# Patient Record
Sex: FEMALE | Race: WHITE | NOT HISPANIC OR LATINO | URBAN - METROPOLITAN AREA
[De-identification: names, ages, dates, MRNs, and addresses within clinical notes are randomized per-mention and may not be internally consistent; named-entity substitution may affect disease eponyms.]

---

## 2024-08-22 PROBLEM — M06.9 RHEUMATOID ARTHRITIS: Status: ACTIVE | Noted: 2024-08-22

## 2024-08-26 ENCOUNTER — TELEPHONE (OUTPATIENT)
Age: 69
End: 2024-08-26
Payer: MEDICARE

## 2024-08-26 PROBLEM — M15.9 OSTEOARTHRITIS OF MULTIPLE JOINTS: Status: ACTIVE | Noted: 2024-08-26

## 2024-08-26 PROBLEM — Z79.1 ENCOUNTER FOR LONG-TERM (CURRENT) USE OF NSAIDS: Status: ACTIVE | Noted: 2024-08-26

## 2024-08-26 PROBLEM — R20.0 NUMBNESS AND TINGLING: Status: ACTIVE | Noted: 2024-08-26

## 2024-08-26 PROBLEM — M81.0 OSTEOPOROSIS: Status: ACTIVE | Noted: 2024-08-26

## 2024-08-26 PROBLEM — E55.9 VITAMIN D DEFICIENCY: Status: ACTIVE | Noted: 2024-08-26

## 2024-08-26 PROBLEM — Z79.899 ENCOUNTER FOR LONG-TERM (CURRENT) USE OF HIGH-RISK MEDICATION: Status: ACTIVE | Noted: 2024-08-26

## 2024-08-26 PROBLEM — M70.61 TROCHANTERIC BURSITIS OF RIGHT HIP: Status: ACTIVE | Noted: 2024-08-26

## 2024-08-26 PROBLEM — R20.2 NUMBNESS AND TINGLING: Status: ACTIVE | Noted: 2024-08-26

## 2024-08-26 NOTE — TELEPHONE ENCOUNTER
CONTACTED PT REGARDING [facility] BEING OUT OFFICE, CAN STILL DO VIRTUAL. SPOTS ARE BLOCKED OFF, BUT CAN RESCHEDULE FOR SAME TIME AS VIRTUAL.     -LYNDSAY

## 2024-09-18 ENCOUNTER — TELEPHONE (OUTPATIENT)
Age: 69
End: 2024-09-18

## 2024-09-18 ENCOUNTER — OFFICE VISIT (OUTPATIENT)
Age: 69
End: 2024-09-18
Payer: MEDICARE

## 2024-09-18 VITALS
BODY MASS INDEX: 28.74 KG/M2 | WEIGHT: 146.4 LBS | HEIGHT: 60 IN | HEART RATE: 56 BPM | TEMPERATURE: 98 F | SYSTOLIC BLOOD PRESSURE: 120 MMHG | DIASTOLIC BLOOD PRESSURE: 70 MMHG

## 2024-09-18 DIAGNOSIS — Z79.899 ENCOUNTER FOR LONG-TERM (CURRENT) USE OF HIGH-RISK MEDICATION: ICD-10-CM

## 2024-09-18 DIAGNOSIS — M06.9 RHEUMATOID ARTHRITIS, INVOLVING UNSPECIFIED SITE, UNSPECIFIED WHETHER RHEUMATOID FACTOR PRESENT: Primary | ICD-10-CM

## 2024-09-18 DIAGNOSIS — M81.0 OSTEOPOROSIS, UNSPECIFIED OSTEOPOROSIS TYPE, UNSPECIFIED PATHOLOGICAL FRACTURE PRESENCE: ICD-10-CM

## 2024-09-18 DIAGNOSIS — Z79.1 ENCOUNTER FOR LONG-TERM (CURRENT) USE OF NSAIDS: ICD-10-CM

## 2024-09-18 DIAGNOSIS — M15.9 OSTEOARTHRITIS OF MULTIPLE JOINTS, UNSPECIFIED OSTEOARTHRITIS TYPE: ICD-10-CM

## 2024-09-18 DIAGNOSIS — R20.0 NUMBNESS AND TINGLING: ICD-10-CM

## 2024-09-18 DIAGNOSIS — E55.9 VITAMIN D DEFICIENCY: ICD-10-CM

## 2024-09-18 DIAGNOSIS — R20.2 NUMBNESS AND TINGLING: ICD-10-CM

## 2024-09-18 PROCEDURE — 99214 OFFICE O/P EST MOD 30 MIN: CPT | Performed by: INTERNAL MEDICINE

## 2024-09-18 PROCEDURE — 1159F MED LIST DOCD IN RCRD: CPT | Performed by: INTERNAL MEDICINE

## 2024-09-18 PROCEDURE — 1160F RVW MEDS BY RX/DR IN RCRD: CPT | Performed by: INTERNAL MEDICINE

## 2024-09-18 RX ORDER — HYDROXYCHLOROQUINE SULFATE 200 MG/1
200 TABLET, FILM COATED ORAL DAILY
Qty: 90 TABLET | Refills: 1 | Status: SHIPPED | OUTPATIENT
Start: 2024-09-18

## 2024-09-18 RX ORDER — DICLOFENAC SODIUM AND MISOPROSTOL 75; 200 MG/1; UG/1
1 TABLET, DELAYED RELEASE ORAL 2 TIMES DAILY
Qty: 180 TABLET | Refills: 1 | Status: SHIPPED | OUTPATIENT
Start: 2024-09-18

## 2024-09-18 RX ORDER — FOLIC ACID 1 MG/1
1 TABLET ORAL DAILY
Qty: 90 TABLET | Refills: 1 | Status: SHIPPED | OUTPATIENT
Start: 2024-09-18

## 2024-09-18 RX ORDER — METHOTREXATE 2.5 MG/1
10 TABLET ORAL WEEKLY
Qty: 48 TABLET | Refills: 0 | Status: SHIPPED | OUTPATIENT
Start: 2024-09-18

## 2024-12-13 NOTE — ASSESSMENT & PLAN NOTE
"Actonel until 2014.  Forteo started 2014 and stopped 2015 by patient's choice due to \"fatigue\".  Pt has historically declined Prolia.   She does not want to take medicine or repeat DEXA in spite of warning of risk of fracture  Left humerus fracture 9/2024    She still declines medication for osteoporosis.    Continue vitamin D supplementation  Encouraged her to take 1200mg calcium daily  Continue weightbearing exercise  "

## 2024-12-13 NOTE — ASSESSMENT & PLAN NOTE
Arthrotec.    Risks of nonsteroidal anti-inflammatory drugs discussed including GI upset, GI bleeding, renal and hepatic risk, and the risks of cardiovascular disease.  Warned not to take with other NSAIDs including over-the-counter NSAIDs.

## 2024-12-13 NOTE — ASSESSMENT & PLAN NOTE
SEROPOSITIVE. ONSET 1997. MTX AND PLAQUENIL 1997. DECLINES MORE AGGRESSIVE THERAPY.    Tender joint count is 0, swollen joint count is 2, patient pain scale is 4.5. Patient global 4.5, physician global is 1.   CDAI  7.5 - low disease activity. Prognosis is guarded.   Continue Plaquenil  She reports she has been off methotrexate since July 2024 when she had COVID infection  Recommend restart MTX 4 tablets once weekly and folic acid 1-2 mg daily  She can try to split MTX dose 2 tablets in AM and 2 tablets in PM on her MTX day to see if this helps with nausea  She has declined biologics.    She gets her labs at [facility].  We repeatedly have issues getting her lab results sent to us.  I will request her most recent labs done in September and December 2024.  Plan is q 3 month monitoring visits and q 6 week labs.  New standing order provided  RTC 3 months

## 2024-12-13 NOTE — PROGRESS NOTES
Office Follow Up      Date: 12/18/2024   Patient Name: Valerie Cee  MRN: [id number]  YOB: 1955    Referring Physician: Son Machado*     Chief Complaint: Rheumatoid arthritis      History of Present Illness: Valerie Cee is a 69 y.o. female who is here today for follow up on rheumatoid arthritis.    No problems with Plaquenil.  She has been off MTX since July 2024 when she had COVID infection.   She does plan to restart the medication soon.  She previously reported nausea the day after MTX.  We have recommended she split the dose and take 2 tablets in the morning and 2 tablets in the evening on the day of her methotrexate dose.  No recent illness or infections.   She takes Arthrotec BID.   Pain scale: 4.5/10. EMS is 1 hr.     She fell and broke her left humerus the day of her last visit 9/18/24. She did not have to have surgery. She is following with [facility]. She is in PT currently.    Subjective   Review of Systems: Negative per patient    Medications:   Current Outpatient Medications:     amLODIPine (NORVASC) 5 MG tablet, Take 1 tablet by mouth Daily., Disp: , Rfl:     ASPIRIN 81 PO, Take 1 tablet by mouth Daily., Disp: , Rfl:     atenolol (TENORMIN) 25 MG tablet, take 1 tablet by mouth once daily, Disp: , Rfl:     clopidogrel (PLAVIX) 75 MG tablet, Take 1 tablet by mouth Daily., Disp: , Rfl:     diclofenac-miSOPROStol (ARTHROTEC 75) 75-0.2 MG EC tablet, Take 1 tablet by mouth 2 (Two) Times a Day., Disp: 180 tablet, Rfl: 1    folic acid (FOLVITE) 1 MG tablet, Take 1 tablet by mouth Daily., Disp: 90 tablet, Rfl: 1    HYDROcodone-acetaminophen (NORCO) 5-325 MG per tablet, take 1 tablet by mouth every 6 hours as needed for pain, Disp: , Rfl:     hydroxychloroquine (PLAQUENIL) 200 MG tablet, Take 1 tablet by mouth Daily., Disp: 90 tablet, Rfl: 1    methotrexate 2.5 MG tablet, Take 4 tablets by mouth 1 (One) Time Per Week., Disp: 48 tablet, Rfl: 0     "nitroglycerin (NITROSTAT) 0.4 MG SL tablet, PLACE 1 TABLET UNDER THE TONGUE AS NEEDED FOR CHEST PAIN, Disp: , Rfl:     simvastatin (Zocor) 40 MG tablet, Take 1 tablet by mouth Every Night., Disp: , Rfl:     ergocalciferol (ERGOCALCIFEROL) 1.25 MG (69752 UT) capsule, Take 1 capsule by mouth Every 14 (Fourteen) Days., Disp: 6 capsule, Rfl: 1    Allergies:   Allergies   Allergen Reactions    Erythromycin Ethylsuccinate Provider Review Needed    Penicillins Provider Review Needed       I have reviewed and updated the patient's chief complaint, history of present illness, review of systems, past medical history, surgical history, family history, social history, medications and allergy list as appropriate.     Objective    Vital Signs:   Vitals:    12/18/24 1015   BP: 124/72   BP Location: Right arm   Patient Position: Sitting   Cuff Size: Adult   Pulse: 60   Temp: 97.8 °F (36.6 °C)   Weight: 65.4 kg (144 lb 3.2 oz)   Height: 152.4 cm (60\")   PainSc:   5       Body mass index is 28.16 kg/m².    Physical Exam:  GENERAL: The patient is well developed and well nourished.  Cooperative and oriented times three.  Affect is normal.  Hydration appears normal.    HEENT: Normocephalic and atraumatic.  No notable alopecia.  No facial rash.  Lids and conjunctiva are normal.  Pupils are equal and sclera are clear.  I see no oral or nasal ulcers.  Lips, teeth, and gums are within normal limits.  Oropharynx is clear.    NECK: Neck is supple without adenopathy, masses, or thyromegaly.    LUNGS: Effort is normal. Lungs are clear without rales or rhonchi.    CARDIOVASCULAR: Normal S1, S2.  No murmurs are heard.    ABDOMEN: Not examined.  EXTREMITIES: There is no edema.   I see no cyanosis or clubbing.    SKIN: Inspection and palpation are normal.  No rashes are present.  Multiple nodules are present.    NEUROLOGIC: Gait is normal.  MUSCULOSKELETAL: Complete joint exam was performed.  There is full range of motion of the right shoulder, " elbows, wrists, and hands without synovitis or atrophy. Deformities with flexion changes are present in the MCP's. Nodules present in some MCPs and PIPs. Angular deformity right 3rd PIP.  Right wrist nontender with chronic synovial thickening, but no active synovitis. Right 2nd MCP and left 3rd MCP with chronic persistent swelling but nontender.  She is wearing a sling on her left arm.  Knees have no palpable effusions.  There is full extension and full flexion of the Bilateral knees without pain.  Ankles have no soft tissue swelling, synovitis, or major deformities.    BACK: Straight without notable scoliosis.  Joint Exam 12/18/2024        Right  Left   MCP 2  Swollen       MCP 3     Swollen      The following joints were examined and normal:   Left Glenohumeral, Right Glenohumeral, Left Elbow, Right Elbow, Left Wrist, Right Wrist, Left MCP 1, Right MCP 1, Left MCP 2, Right MCP 3, Left MCP 4, Right MCP 4, Left MCP 5, Right MCP 5, Left IP (thumb), Right IP (thumb), Left PIP 2 (finger), Right PIP 2 (finger), Left PIP 3 (finger), Right PIP 3 (finger), Left PIP 4 (finger), Right PIP 4 (finger), Left PIP 5 (finger), Right PIP 5 (finger), Left Knee, Right Knee       Patient Global: 45 / 100  Provider Global: 10 / 100      Assessment / Plan      Assessment & Plan  Rheumatoid arthritis, involving unspecified site, unspecified whether rheumatoid factor present  SEROPOSITIVE. ONSET 1997. MTX AND PLAQUENIL 1997. DECLINES MORE AGGRESSIVE THERAPY.    Tender joint count is 0, swollen joint count is 2, patient pain scale is 4.5. Patient global 4.5, physician global is 1.   CDAI  7.5 - low disease activity. Prognosis is guarded.   Continue Plaquenil  She reports she has been off methotrexate since July 2024 when she had COVID infection  Recommend restart MTX 4 tablets once weekly and folic acid 1-2 mg daily  She can try to split MTX dose 2 tablets in AM and 2 tablets in PM on her MTX day to see if this helps with nausea  She has  "declined biologics.    She gets her labs at [facility].  We repeatedly have issues getting her lab results sent to us.  I will request her most recent labs done in September and December 2024.  Plan is q 3 month monitoring visits and q 6 week labs.  New standing order provided  RTC 3 months  Encounter for long-term (current) use of high-risk medication  Methotrexate and Plaquenil.  She will need every 6 week labs and yearly ocular exams.  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  She is stable.  Continue Arthrotec.  Encounter for long-term (current) use of NSAIDs  Arthrotec.    Risks of nonsteroidal anti-inflammatory drugs discussed including GI upset, GI bleeding, renal and hepatic risk, and the risks of cardiovascular disease.  Warned not to take with other NSAIDs including over-the-counter NSAIDs.    Numbness and tingling  Better with physical therapy.  She has declined nerve conduction study.  Osteoporosis, unspecified osteoporosis type, unspecified pathological fracture presence  Actonel until 2014.  Forteo started 2014 and stopped 2015 by patient's choice due to \"fatigue\".  Pt has historically declined Prolia.   She does not want to take medicine or repeat DEXA in spite of warning of risk of fracture  Left humerus fracture 9/2024    She still declines medication for osteoporosis.    Continue vitamin D supplementation  Encouraged her to take 1200mg calcium daily  Continue weightbearing exercise  Vitamin D deficiency  She takes 34994 IU vitamin D2 every other week. Refilled.   Check level with next labs      Follow Up:   Return in about 3 months (around 3/18/2025) for Dr. Asher, CYDNEY Patrick.        CYDNEY Patrick  [facility]   "

## 2024-12-18 ENCOUNTER — OFFICE VISIT (OUTPATIENT)
Age: 69
End: 2024-12-18
Payer: MEDICARE

## 2024-12-18 ENCOUNTER — TELEPHONE (OUTPATIENT)
Age: 69
End: 2024-12-18

## 2024-12-18 VITALS
WEIGHT: 144.2 LBS | HEIGHT: 60 IN | HEART RATE: 60 BPM | SYSTOLIC BLOOD PRESSURE: 124 MMHG | TEMPERATURE: 97.8 F | DIASTOLIC BLOOD PRESSURE: 72 MMHG | BODY MASS INDEX: 28.31 KG/M2

## 2024-12-18 DIAGNOSIS — M15.9 OSTEOARTHRITIS OF MULTIPLE JOINTS, UNSPECIFIED OSTEOARTHRITIS TYPE: ICD-10-CM

## 2024-12-18 DIAGNOSIS — R20.2 NUMBNESS AND TINGLING: ICD-10-CM

## 2024-12-18 DIAGNOSIS — Z79.1 ENCOUNTER FOR LONG-TERM (CURRENT) USE OF NSAIDS: ICD-10-CM

## 2024-12-18 DIAGNOSIS — R20.0 NUMBNESS AND TINGLING: ICD-10-CM

## 2024-12-18 DIAGNOSIS — M81.0 OSTEOPOROSIS, UNSPECIFIED OSTEOPOROSIS TYPE, UNSPECIFIED PATHOLOGICAL FRACTURE PRESENCE: ICD-10-CM

## 2024-12-18 DIAGNOSIS — E55.9 VITAMIN D DEFICIENCY: ICD-10-CM

## 2024-12-18 DIAGNOSIS — Z79.899 ENCOUNTER FOR LONG-TERM (CURRENT) USE OF HIGH-RISK MEDICATION: ICD-10-CM

## 2024-12-18 DIAGNOSIS — M06.9 RHEUMATOID ARTHRITIS, INVOLVING UNSPECIFIED SITE, UNSPECIFIED WHETHER RHEUMATOID FACTOR PRESENT: Primary | ICD-10-CM

## 2024-12-18 RX ORDER — FOLIC ACID 1 MG/1
1 TABLET ORAL DAILY
Qty: 90 TABLET | Refills: 1 | Status: CANCELLED | OUTPATIENT
Start: 2024-12-18

## 2024-12-18 RX ORDER — NITROGLYCERIN 0.4 MG/1
TABLET SUBLINGUAL
COMMUNITY
Start: 2024-11-30

## 2024-12-18 RX ORDER — HYDROCODONE BITARTRATE AND ACETAMINOPHEN 5; 325 MG/1; MG/1
TABLET ORAL
COMMUNITY
Start: 2024-10-14

## 2024-12-18 RX ORDER — ERGOCALCIFEROL 1.25 MG/1
50000 CAPSULE ORAL
Qty: 6 CAPSULE | Refills: 1 | Status: SHIPPED | OUTPATIENT
Start: 2024-12-18 | End: 2025-12-18

## 2024-12-18 RX ORDER — ATENOLOL 25 MG/1
TABLET ORAL
COMMUNITY
Start: 2024-11-23

## 2024-12-18 RX ORDER — METHOTREXATE 2.5 MG/1
10 TABLET ORAL WEEKLY
Qty: 48 TABLET | Refills: 0 | Status: SHIPPED | OUTPATIENT
Start: 2024-12-18

## 2024-12-18 RX ORDER — HYDROXYCHLOROQUINE SULFATE 200 MG/1
200 TABLET, FILM COATED ORAL DAILY
Qty: 90 TABLET | Refills: 1 | Status: SHIPPED | OUTPATIENT
Start: 2024-12-18

## 2024-12-18 RX ORDER — DICLOFENAC SODIUM AND MISOPROSTOL 75; 200 MG/1; UG/1
1 TABLET, DELAYED RELEASE ORAL 2 TIMES DAILY
Qty: 180 TABLET | Refills: 1 | Status: SHIPPED | OUTPATIENT
Start: 2024-12-18

## 2024-12-18 NOTE — TELEPHONE ENCOUNTER
I called [facility] to request lab results and I had to lmom per voicemail instructions. I asked them to fax most recent labs ASAP.  I left both our call back number and fax number in my message. -MCKAY HongA

## 2025-02-20 ENCOUNTER — TELEPHONE (OUTPATIENT)
Age: 70
End: 2025-02-20
Payer: MEDICARE

## 2025-02-20 NOTE — TELEPHONE ENCOUNTER
NOTIFIED PT THRU [facility] MESSENGER, AND SENT A LETTER VIA [facility] AND [facility] [employer] ADVISING OF [facility]S CANCELLATION AND THAT HE WILL BE OUT OF OFFICE.    ADDED PT TO WAITLIST AND SENDING INSTRUCTIONS ON HOW TO SET UP [facility] VIA [facility]ARD COP[employer].    -LYNDSAY

## 2025-04-07 ENCOUNTER — RESULTS FOLLOW-UP (OUTPATIENT)
Age: 70
End: 2025-04-07
Payer: MEDICARE

## 2025-04-07 NOTE — LETTER
Valerie Cee  [address]  [facility] [address]    April 7, 2025     Dear Ms. Cee:    Below are the results from your recent labs:        The test results show that your CMP and CRP are stable .  If you have any questions or concerns, please don't hesitate to call.         Sincerely,        CYDNEY Knowles

## 2025-06-20 ENCOUNTER — OFFICE VISIT (OUTPATIENT)
Age: 70
End: 2025-06-20
Payer: MEDICARE

## 2025-06-20 ENCOUNTER — TELEPHONE (OUTPATIENT)
Age: 70
End: 2025-06-20

## 2025-06-20 VITALS
DIASTOLIC BLOOD PRESSURE: 70 MMHG | BODY MASS INDEX: 29.92 KG/M2 | RESPIRATION RATE: 16 BRPM | HEART RATE: 66 BPM | SYSTOLIC BLOOD PRESSURE: 126 MMHG | TEMPERATURE: 96.9 F | HEIGHT: 60 IN | WEIGHT: 152.4 LBS

## 2025-06-20 DIAGNOSIS — M15.9 OSTEOARTHRITIS OF MULTIPLE JOINTS, UNSPECIFIED OSTEOARTHRITIS TYPE: Chronic | ICD-10-CM

## 2025-06-20 DIAGNOSIS — Z79.1 ENCOUNTER FOR LONG-TERM (CURRENT) USE OF NSAIDS: Chronic | ICD-10-CM

## 2025-06-20 DIAGNOSIS — Z79.899 ENCOUNTER FOR LONG-TERM (CURRENT) USE OF HIGH-RISK MEDICATION: Chronic | ICD-10-CM

## 2025-06-20 DIAGNOSIS — M06.9 RHEUMATOID ARTHRITIS, INVOLVING UNSPECIFIED SITE, UNSPECIFIED WHETHER RHEUMATOID FACTOR PRESENT: Chronic | ICD-10-CM

## 2025-06-20 RX ORDER — DICLOFENAC SODIUM AND MISOPROSTOL 75; 200 MG/1; UG/1
1 TABLET, DELAYED RELEASE ORAL 2 TIMES DAILY
Qty: 180 TABLET | Refills: 1 | Status: SHIPPED | OUTPATIENT
Start: 2025-06-20

## 2025-06-20 RX ORDER — FOLIC ACID 1 MG/1
1 TABLET ORAL DAILY
Qty: 90 TABLET | Refills: 1 | Status: SHIPPED | OUTPATIENT
Start: 2025-06-20

## 2025-06-20 RX ORDER — ERGOCALCIFEROL 1.25 MG/1
50000 CAPSULE ORAL
Qty: 6 CAPSULE | Refills: 1 | Status: SHIPPED | OUTPATIENT
Start: 2025-06-20 | End: 2026-06-20

## 2025-06-20 RX ORDER — HYDROXYCHLOROQUINE SULFATE 200 MG/1
200 TABLET, FILM COATED ORAL DAILY
Qty: 90 TABLET | Refills: 1 | Status: SHIPPED | OUTPATIENT
Start: 2025-06-20

## 2025-06-20 RX ORDER — METHOTREXATE 2.5 MG/1
10 TABLET ORAL WEEKLY
Qty: 48 TABLET | Refills: 0 | Status: SHIPPED | OUTPATIENT
Start: 2025-06-20

## 2025-06-20 NOTE — TELEPHONE ENCOUNTER
I called [facility].  [facility] said they have labs from 6/13/25. They are faxing them to us. -Katheryn Gutierrez, MCKAYA

## 2025-06-20 NOTE — ASSESSMENT & PLAN NOTE
Methotrexate and Plaquenil.  She will need every 6 week labs and yearly ocular exams.  Requested labs for me from [facility] from 6/13/2025

## 2025-06-20 NOTE — PROGRESS NOTES
Office Follow Up      Date: 06/20/2025   Patient Name: Valerie Cee  MRN: [id number]  YOB: 1955    Referring Physician: No ref. provider found     Chief Complaint: Rheumatoid arthritis      History of Present Illness: Valerie Cee is a 69 y.o. female who is here today for follow up on rheumatoid arthritis.    No problems with Plaquenil.    She has been off MTX since July 2024 when she had COVID infection.   She does plan to restart the medication soon.  She previously reported nausea the day after MTX.      We have recommended she split the dose and take 2 tablets in the morning and 2 tablets in the evening on the day of her methotrexate dose.    No recent illness or infections.   She takes Arthrotec BID.   Pain scale: 6.5/10. EMS is 30 min .     She fell and broke her left humerus 9/18/24. She did not have to have surgery. She is following with [facility]. This did not heal properly, she graduated from  and now does home exercises.     No other significant changes.  She feels that her RA is overall stable      Subjective   Review of Systems: Negative per patient    Medications:   Current Outpatient Medications:     amLODIPine (NORVASC) 5 MG tablet, Take 1 tablet by mouth Daily., Disp: , Rfl:     ASPIRIN 81 PO, Take 1 tablet by mouth Daily., Disp: , Rfl:     atenolol (TENORMIN) 25 MG tablet, take 1 tablet by mouth once daily, Disp: , Rfl:     clopidogrel (PLAVIX) 75 MG tablet, Take 1 tablet by mouth Daily., Disp: , Rfl:     diclofenac-miSOPROStol (ARTHROTEC 75) 75-0.2 MG EC tablet, Take 1 tablet by mouth 2 (Two) Times a Day., Disp: 180 tablet, Rfl: 1    ergocalciferol (ERGOCALCIFEROL) 1.25 MG (76488 UT) capsule, Take 1 capsule by mouth Every 14 (Fourteen) Days., Disp: 6 capsule, Rfl: 1    folic acid (FOLVITE) 1 MG tablet, Take 1 tablet by mouth Daily., Disp: 90 tablet, Rfl: 1    HYDROcodone-acetaminophen (NORCO) 5-325 MG per tablet, take 1 tablet by mouth  "every 6 hours as needed for pain, Disp: , Rfl:     hydroxychloroquine (PLAQUENIL) 200 MG tablet, Take 1 tablet by mouth Daily., Disp: 90 tablet, Rfl: 1    methotrexate 2.5 MG tablet, Take 4 tablets by mouth 1 (One) Time Per Week., Disp: 48 tablet, Rfl: 0    nitroglycerin (NITROSTAT) 0.4 MG SL tablet, PLACE 1 TABLET UNDER THE TONGUE AS NEEDED FOR CHEST PAIN, Disp: , Rfl:     simvastatin (Zocor) 40 MG tablet, Take 1 tablet by mouth Every Night., Disp: , Rfl:     Allergies:   Allergies   Allergen Reactions    Erythromycin Ethylsuccinate Provider Review Needed    Penicillins Provider Review Needed       I have reviewed and updated the patient's chief complaint, history of present illness, review of systems, past medical history, surgical history, family history, social history, medications and allergy list as appropriate.     Objective    Vital Signs:   Vitals:    06/20/25 1402   BP: 126/70   BP Location: Left arm   Patient Position: Sitting   Cuff Size: Adult   Pulse: 66   Resp: 16   Temp: 96.9 °F (36.1 °C)   TempSrc: Infrared   Weight: 69.1 kg (152 lb 6.4 oz)   Height: 152.4 cm (60\")   PainSc: 0-No pain       Body mass index is 29.76 kg/m².    Physical Exam:  Physical Exam  Constitutional:       Appearance: Normal appearance.   HENT:      Head: Normocephalic.   Eyes:      Pupils: Pupils are equal, round, and reactive to light.   Cardiovascular:      Rate and Rhythm: Regular rhythm.      Pulses: Normal pulses.      Heart sounds: Normal heart sounds.   Pulmonary:      Effort: Pulmonary effort is normal.      Breath sounds: Normal breath sounds.   Musculoskeletal:      Cervical back: Normal range of motion and neck supple.      Comments: Deformity to left shoulder   Significant OA changes to her bilateral hands   RA nodules throughout hands   RA deformities bilateral hands   Left knee crepitus    Skin:     General: Skin is warm.   Neurological:      General: No focal deficit present.      Mental Status: She is alert and " oriented to person, place, and time.   Psychiatric:         Behavior: Behavior normal.         Thought Content: Thought content normal.          Assessment / Plan      Assessment & Plan  Rheumatoid arthritis, involving unspecified site, unspecified whether rheumatoid factor present  SEROPOSITIVE. ONSET 1997. MTX AND PLAQUENIL 1997. DECLINES MORE AGGRESSIVE THERAPY.    Tender joint count is 0, swollen joint count is 0, patient pain scale is 6.5. Patient global 6.5, physician global is 1.   CDAI  7.5 - low disease activity. Prognosis is guarded.   Continue Plaquenil  restarted MTX 4 tablets once weekly and folic acid 1-2 mg daily-   She reports this makes her sick, however she is able to tolerate the nausea  She can try to split MTX dose 2 tablets in AM and 2 tablets in PM on her MTX day to see if this helps with nausea  She has declined biologics.    She gets her labs at [facility].  We repeatedly have issues getting her lab results sent to us.  I will request her most recent labs done in last Friday (6/13/2025) plan is q 3 month monitoring visits and q 6 week labs.  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  Continue Arthrotec.  Encounter for long-term (current) use of NSAIDs  Arthrotec.    Requested labs from [facility] from 6/13/2025    Risks of nonsteroidal anti-inflammatory drugs discussed including GI upset, GI bleeding, renal and hepatic risk, and the risks of cardiovascular disease.  Warned not to take with other NSAIDs including over-the-counter NSAIDs.    Encounter for long-term (current) use of high-risk medication  Methotrexate and Plaquenil.  She will need every 6 week labs and yearly ocular exams.  Requested labs for me from [facility] from 6/13/2025        Follow Up:   Return in about 3 months (around 9/20/2025) for NP's, Dr. Asher.        CYDNEY [place]    [facility]  [place]

## 2025-06-20 NOTE — ASSESSMENT & PLAN NOTE
SEROPOSITIVE. ONSET 1997. MTX AND PLAQUENIL 1997. DECLINES MORE AGGRESSIVE THERAPY.    Tender joint count is 0, swollen joint count is 0, patient pain scale is 6.5. Patient global 6.5, physician global is 1.   CDAI  7.5 - low disease activity. Prognosis is guarded.   Continue Plaquenil  restarted MTX 4 tablets once weekly and folic acid 1-2 mg daily-   She reports this makes her sick, however she is able to tolerate the nausea  She can try to split MTX dose 2 tablets in AM and 2 tablets in PM on her MTX day to see if this helps with nausea  She has declined biologics.    She gets her labs at [facility].  We repeatedly have issues getting her lab results sent to us.  I will request her most recent labs done in last Friday (6/13/2025) plan is q 3 month monitoring visits and q 6 week labs.  New standing order provided  RTC 3 months

## 2025-06-20 NOTE — ASSESSMENT & PLAN NOTE
Arthrotec.    Requested labs from [facility] from 6/13/2025    Risks of nonsteroidal anti-inflammatory drugs discussed including GI upset, GI bleeding, renal and hepatic risk, and the risks of cardiovascular disease.  Warned not to take with other NSAIDs including over-the-counter NSAIDs.